# Patient Record
Sex: MALE | Race: BLACK OR AFRICAN AMERICAN
[De-identification: names, ages, dates, MRNs, and addresses within clinical notes are randomized per-mention and may not be internally consistent; named-entity substitution may affect disease eponyms.]

---

## 2019-08-15 ENCOUNTER — HOSPITAL ENCOUNTER (EMERGENCY)
Dept: HOSPITAL 54 - ER | Age: 29
Discharge: HOME | End: 2019-08-15
Payer: SELF-PAY

## 2019-08-15 VITALS — WEIGHT: 152 LBS | HEIGHT: 69 IN | BODY MASS INDEX: 22.51 KG/M2

## 2019-08-15 VITALS — SYSTOLIC BLOOD PRESSURE: 118 MMHG | DIASTOLIC BLOOD PRESSURE: 76 MMHG

## 2019-08-15 DIAGNOSIS — S63.114A: Primary | ICD-10-CM
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
## 2019-08-15 NOTE — NUR
"DISLOCATED RIGHT THUMB", INJURED WHILE PLAYING BASKETBALL . PT AAOX4, VSS. 
DENIES ANY OTHER DISCOMFORT @ THIS TIME. PT SEEN & EVAL'D BY ROSAS GARCIA & 
WILL CONT TO MONITOR.